# Patient Record
Sex: FEMALE | Race: WHITE | Employment: UNEMPLOYED | URBAN - METROPOLITAN AREA
[De-identification: names, ages, dates, MRNs, and addresses within clinical notes are randomized per-mention and may not be internally consistent; named-entity substitution may affect disease eponyms.]

---

## 2022-06-28 ENCOUNTER — HOSPITAL ENCOUNTER (EMERGENCY)
Age: 42
Discharge: HOME | End: 2022-06-28

## 2022-06-28 VITALS
WEIGHT: 125 LBS | TEMPERATURE: 98.2 F | RESPIRATION RATE: 18 BRPM | BODY MASS INDEX: 23 KG/M2 | HEIGHT: 62 IN | HEART RATE: 91 BPM | SYSTOLIC BLOOD PRESSURE: 127 MMHG | OXYGEN SATURATION: 97 % | DIASTOLIC BLOOD PRESSURE: 77 MMHG

## 2022-06-28 DIAGNOSIS — K02.9 PAIN DUE TO DENTAL CARIES: Primary | ICD-10-CM

## 2022-06-28 PROCEDURE — 99283 EMERGENCY DEPT VISIT LOW MDM: CPT

## 2022-06-28 PROCEDURE — 6370000000 HC RX 637 (ALT 250 FOR IP): Performed by: NURSE PRACTITIONER

## 2022-06-28 RX ORDER — CHLORHEXIDINE GLUCONATE 0.12 MG/ML
15 RINSE ORAL 2 TIMES DAILY
Qty: 420 ML | Refills: 0 | Status: SHIPPED | OUTPATIENT
Start: 2022-06-28 | End: 2022-07-12

## 2022-06-28 RX ORDER — LIDOCAINE HYDROCHLORIDE 20 MG/ML
5 SOLUTION OROPHARYNGEAL PRN
Qty: 100 ML | Refills: 0 | Status: SHIPPED | OUTPATIENT
Start: 2022-06-28 | End: 2022-07-05

## 2022-06-28 RX ORDER — AMOXICILLIN AND CLAVULANATE POTASSIUM 875; 125 MG/1; MG/1
1 TABLET, FILM COATED ORAL ONCE
Status: COMPLETED | OUTPATIENT
Start: 2022-06-28 | End: 2022-06-28

## 2022-06-28 RX ORDER — AMOXICILLIN AND CLAVULANATE POTASSIUM 875; 125 MG/1; MG/1
1 TABLET, FILM COATED ORAL 2 TIMES DAILY
Qty: 20 TABLET | Refills: 0 | Status: SHIPPED | OUTPATIENT
Start: 2022-06-28 | End: 2022-07-08

## 2022-06-28 RX ADMIN — AMOXICILLIN AND CLAVULANATE POTASSIUM 1 TABLET: 875; 125 TABLET, FILM COATED ORAL at 15:04

## 2022-06-28 ASSESSMENT — PAIN - FUNCTIONAL ASSESSMENT: PAIN_FUNCTIONAL_ASSESSMENT: 0-10

## 2022-06-28 ASSESSMENT — PAIN SCALES - GENERAL: PAINLEVEL_OUTOF10: 3

## 2022-06-28 NOTE — ED PROVIDER NOTES
Independent MLP  HPI: Hank Guevara 38 y.o. female with a past medical history of   Past Medical History:   Diagnosis Date    Asthma     presents with a complaint of dental pain. The patient states this pain has been gradual in onset, persistent, moderate in severity and worse today which is what prompted the visit. She states that she was eating yesterday & she scraped her with something she was eating. She states that she had pain to the area ever since. Pain has not been relieved with any OTC medications. Patient denies any unilateral facial swelling. Patient is able to handle their own secretions and drink fluids without difficulty. Patient denies any fever. The patient also denies any history of dental trauma. Denies difficulty breathing or swallowing. The location of the pain and appears to be isolated over tooth number 7. Patient states that she does not have a dentist.     ROS:   Unless otherwise stated in this report or unable to obtain because of the patient's clinical or mental status as evidenced by the medical record, this patients's positive and negative responses for Review of Systems, constitutional, psych, eyes, ENT, cardiovascular, respiratory, gastrointestinal, neurological, genitourinary, musculoskeletal, integument systems and systems related to the presenting problem are either stated in the preceding or were not pertinent or were negative for the symptoms and/or complaints related to the medical problem.     Nursing Notes reviewed  /77   Pulse 91   Temp 98.2 °F (36.8 °C) (Oral)   Resp 18   Ht 5' 2\" (1.575 m)   Wt 125 lb (56.7 kg)   SpO2 97%   BMI 22.86 kg/m²     Physical exam:  Constitutional: The patient is comfortable, alert and oriented x3, well appearing, non toxic in NAD. Head: Atraumatic and normocephalic.   Eyes: No discharge from the eyes the sclerae are normal.  ENT: The oropharynx is normal. No pharyngeal erythema, uvular edema, tonsillar exudates, asymmetry or trismus. Mouth is normal to inspection. Poor dentition throughout with broken teeth. Pain on percussion of the tooth 7 which is broken, but she states that is has been broken for quite some time, it is not from this incident. There is no evidence of facial asymmetry or abscess formation. Floor of the mouth is soft. No tenderness in the submental or submandibular space. No tongue elevation. No anug. Neck: The neck demonstrates normal range of motion. No meningeals signs are present. No stridor. Respiratory/chest: The chest is nontender. Breath sounds are normal. no respiratory distress is noted  Cardiovascular: S1S2, Heart shows a regular rate and rhythm no murmurs no rubs no gallops. Skin: The skin exam shows no evidence of rashes  Neuro: GCS is 15  Lymphatic: No cervical lymphadenopathy           Medical Decision Making: Exam and history consistent with dental pain without evidence of gross infection. There is no evidence of dental abscess. At this time we will  the patient on following up in dental clinic and provide pain relief. Patient advised on warning signs for which she should return to the emergency department for and she verbalizes understanding. Patient was provided with dental referral for follow-up.       Impression:   1) Dental Pain  2) Dental Caries    Disposition: Discharge  Condition: Stable           Gina SAMUEL Leon - CNP  06/28/22 [id number]

## 2024-05-27 ENCOUNTER — HOSPITAL ENCOUNTER (EMERGENCY)
Age: 44
Discharge: HOME | End: 2024-05-27
Attending: FAMILY MEDICINE

## 2024-05-27 VITALS
SYSTOLIC BLOOD PRESSURE: 142 MMHG | BODY MASS INDEX: 22.45 KG/M2 | HEART RATE: 72 BPM | WEIGHT: 122 LBS | TEMPERATURE: 97.7 F | OXYGEN SATURATION: 100 % | HEIGHT: 62 IN | RESPIRATION RATE: 18 BRPM | DIASTOLIC BLOOD PRESSURE: 93 MMHG

## 2024-05-27 DIAGNOSIS — L23.7 POISON IVY DERMATITIS: Primary | ICD-10-CM

## 2024-05-27 PROCEDURE — 96372 THER/PROPH/DIAG INJ SC/IM: CPT

## 2024-05-27 PROCEDURE — 6360000002 HC RX W HCPCS: Performed by: FAMILY MEDICINE

## 2024-05-27 PROCEDURE — 99284 EMERGENCY DEPT VISIT MOD MDM: CPT

## 2024-05-27 RX ORDER — DEXAMETHASONE SODIUM PHOSPHATE 10 MG/ML
8 INJECTION, SOLUTION INTRAMUSCULAR; INTRAVENOUS ONCE
Status: COMPLETED | OUTPATIENT
Start: 2024-05-27 | End: 2024-05-27

## 2024-05-27 RX ORDER — PREDNISONE 20 MG/1
40 TABLET ORAL DAILY
Qty: 10 TABLET | Refills: 0 | Status: SHIPPED | OUTPATIENT
Start: 2024-05-27 | End: 2024-06-01

## 2024-05-27 RX ADMIN — DEXAMETHASONE SODIUM PHOSPHATE 8 MG: 10 INJECTION, SOLUTION INTRAMUSCULAR; INTRAVENOUS at 11:41

## 2024-05-27 ASSESSMENT — PAIN - FUNCTIONAL ASSESSMENT: PAIN_FUNCTIONAL_ASSESSMENT: NONE - DENIES PAIN

## 2024-05-27 NOTE — ED PROVIDER NOTES
HPI:  5/27/24,   Time: 11:37 AM EDT         Catherine Pino is a 44 y.o. female presenting to the ED for 5-day history of rash and itching that is worsening and spreading up the arms.  She was doing some gardening a few days ago before the onset of the rash.      ROS:   Pertinent positives and negatives are stated within HPI, all other systems reviewed and are negative.  --------------------------------------------- PAST HISTORY ---------------------------------------------  Past Medical History:  has a past medical history of Asthma.    Past Surgical History:  has no past surgical history on file.    Social History:  reports that she has been smoking cigarettes. She does not have any smokeless tobacco history on file. She reports that she does not drink alcohol and does not use drugs.    Family History: family history is not on file.     The patient’s home medications have been reviewed.    Allergies: Tetanus toxoids    -------------------------------------------------- RESULTS -------------------------------------------------  All laboratory and radiology results have been personally reviewed by myself   LABS:  No results found for this visit on 05/27/24.    RADIOLOGY:  Interpreted by Radiologist.  No orders to display       ------------------------- NURSING NOTES AND VITALS REVIEWED ---------------------------   The nursing notes within the ED encounter and vital signs as below have been reviewed.   BP (!) 142/93   Pulse 72   Temp 97.7 °F (36.5 °C) (Infrared)   Resp 18   Ht 1.575 m (5' 2\")   Wt 55.3 kg (122 lb)   SpO2 100%   BMI 22.31 kg/m²   Oxygen Saturation Interpretation: Normal      ---------------------------------------------------PHYSICAL EXAM--------------------------------------    Constitutional/General: Alert and oriented x3, well appearing, non toxic in NAD  Head: NC/AT  Eyes: PERRL, EOMI  Mouth: Oropharynx clear, handling secretions, no trismus  Neck: Supple, full ROM, no meningeal

## 2024-09-21 ENCOUNTER — HOSPITAL ENCOUNTER (EMERGENCY)
Age: 44
Discharge: HOME | End: 2024-09-21

## 2024-09-21 VITALS
OXYGEN SATURATION: 97 % | RESPIRATION RATE: 16 BRPM | SYSTOLIC BLOOD PRESSURE: 121 MMHG | DIASTOLIC BLOOD PRESSURE: 92 MMHG | TEMPERATURE: 98.5 F | HEART RATE: 106 BPM

## 2024-09-21 DIAGNOSIS — R21 RASH AND OTHER NONSPECIFIC SKIN ERUPTION: Primary | ICD-10-CM

## 2024-09-21 LAB — GLUCOSE BLD-MCNC: 89 MG/DL (ref 74–99)

## 2024-09-21 PROCEDURE — 99283 EMERGENCY DEPT VISIT LOW MDM: CPT

## 2024-09-21 PROCEDURE — 82962 GLUCOSE BLOOD TEST: CPT

## 2024-09-21 RX ORDER — PERMETHRIN 50 MG/G
CREAM TOPICAL
Qty: 60 G | Refills: 1 | Status: SHIPPED | OUTPATIENT
Start: 2024-09-21

## 2024-09-21 RX ORDER — SULFAMETHOXAZOLE/TRIMETHOPRIM 800-160 MG
2 TABLET ORAL 2 TIMES DAILY
Qty: 40 TABLET | Refills: 0 | Status: SHIPPED | OUTPATIENT
Start: 2024-09-21 | End: 2024-10-01

## 2024-09-21 RX ORDER — MUPIROCIN 20 MG/G
OINTMENT TOPICAL
Qty: 1 EACH | Refills: 1 | Status: SHIPPED | OUTPATIENT
Start: 2024-09-21

## 2024-09-21 ASSESSMENT — LIFESTYLE VARIABLES
HOW OFTEN DO YOU HAVE A DRINK CONTAINING ALCOHOL: MONTHLY OR LESS
HOW MANY STANDARD DRINKS CONTAINING ALCOHOL DO YOU HAVE ON A TYPICAL DAY: 1 OR 2